# Patient Record
Sex: FEMALE | Race: WHITE | NOT HISPANIC OR LATINO | URBAN - METROPOLITAN AREA
[De-identification: names, ages, dates, MRNs, and addresses within clinical notes are randomized per-mention and may not be internally consistent; named-entity substitution may affect disease eponyms.]

---

## 2022-07-05 ENCOUNTER — APPOINTMENT (OUTPATIENT)
Dept: URBAN - METROPOLITAN AREA CLINIC 291 | Age: 22
Setting detail: DERMATOLOGY
End: 2022-07-05

## 2022-07-05 DIAGNOSIS — L03.01 CELLULITIS OF FINGER: ICD-10-CM

## 2022-07-05 DIAGNOSIS — L30.8 OTHER SPECIFIED DERMATITIS: ICD-10-CM

## 2022-07-05 DIAGNOSIS — D22 MELANOCYTIC NEVI: ICD-10-CM

## 2022-07-05 PROBLEM — L03.011 CELLULITIS OF RIGHT FINGER: Status: ACTIVE | Noted: 2022-07-05

## 2022-07-05 PROBLEM — D22.5 MELANOCYTIC NEVI OF TRUNK: Status: ACTIVE | Noted: 2022-07-05

## 2022-07-05 PROCEDURE — OTHER ORDER TESTS: OTHER

## 2022-07-05 PROCEDURE — OTHER PRESCRIPTION MEDICATION MANAGEMENT: OTHER

## 2022-07-05 PROCEDURE — OTHER COUNSELING: OTHER

## 2022-07-05 PROCEDURE — 99203 OFFICE O/P NEW LOW 30 MIN: CPT

## 2022-07-05 PROCEDURE — OTHER TREATMENT REGIMEN: OTHER

## 2022-07-05 PROCEDURE — OTHER PRESCRIPTION: OTHER

## 2022-07-05 RX ORDER — DOXYCYCLINE HYCLATE 100 MG/1
CAPSULE, GELATIN COATED ORAL
Qty: 20 | Refills: 0 | Status: ERX | COMMUNITY
Start: 2022-07-05

## 2022-07-05 RX ORDER — MUPIROCIN 20 MG/G
OINTMENT TOPICAL
Qty: 22 | Refills: 0 | Status: ERX | COMMUNITY
Start: 2022-07-05

## 2022-07-05 ASSESSMENT — LOCATION SIMPLE DESCRIPTION DERM
LOCATION SIMPLE: RIGHT RING FINGER
LOCATION SIMPLE: LEFT MIDDLE FINGER
LOCATION SIMPLE: RIGHT SMALL FINGER
LOCATION SIMPLE: LEFT INDEX FINGER
LOCATION SIMPLE: RIGHT MIDDLE FINGER
LOCATION SIMPLE: RIGHT LOWER BACK
LOCATION SIMPLE: RIGHT INDEX FINGER
LOCATION SIMPLE: LEFT RING FINGER

## 2022-07-05 ASSESSMENT — LOCATION ZONE DERM
LOCATION ZONE: FINGER
LOCATION ZONE: TRUNK

## 2022-07-05 ASSESSMENT — LOCATION DETAILED DESCRIPTION DERM
LOCATION DETAILED: LEFT RING FINGERTIP
LOCATION DETAILED: RIGHT DISTAL PALMAR INDEX FINGER
LOCATION DETAILED: RIGHT MIDDLE FINGERTIP
LOCATION DETAILED: RIGHT SMALL FINGERTIP
LOCATION DETAILED: RIGHT INFERIOR MEDIAL MIDBACK
LOCATION DETAILED: PERIUNGUAL SKIN RIGHT MIDDLE FINGER
LOCATION DETAILED: LEFT MIDDLE FINGERTIP
LOCATION DETAILED: RIGHT RING FINGERTIP
LOCATION DETAILED: LEFT INDEX FINGERTIP

## 2022-07-05 NOTE — PROCEDURE: PRESCRIPTION MEDICATION MANAGEMENT
Initiate Treatment: Doxycycline, 100 mg PO twice daily for 10 days.  Take with food and water.\\nMupirocin ointment, apply to affected area 3x/day for 7 days
Detail Level: Zone
Render In Strict Bullet Format?: No

## 2022-07-05 NOTE — HPI: RASH (ECZEMA)
How Severe Is Your Eczema?: moderate
Is This A New Presentation, Or A Follow-Up?: Rash
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  She washes her hands frequently.  She had a virtual visit a few months ago and was prescribed Clobetasol which helps but the rash continues to come and go.  She has stopped using hand .

## 2022-07-05 NOTE — PROCEDURE: TREATMENT REGIMEN
Continue Regimen: Clobetasol ointment twice daily, cycle 2 weeks on, 2 weeks off
Plan: Pending improvement consider Elidel for maintenance.  Patient also reports a scaly rash on her legs that come and go.  She will take a photo next time this occurs.  If unable to control with topical consider biopsy to rule out psoriasis.  Patient reports a family history of psoriasis
Otc Regimen: Recommend gentle moisturizer such as Cetaphil cream
Detail Level: Zone

## 2022-07-05 NOTE — HPI: SECONDARY COMPLAINT
How Severe Is This Condition?: mild
Additional History: Pt states that the mole use to be flat but has since raised.

## 2022-07-08 ENCOUNTER — RX ONLY (RX ONLY)
Age: 22
End: 2022-07-08

## 2022-07-08 RX ORDER — CLOBETASOL PROPIONATE 0.5 MG/G
OINTMENT TOPICAL
Qty: 60 | Refills: 2 | Status: ERX | COMMUNITY
Start: 2022-07-08

## 2022-07-12 ENCOUNTER — RX ONLY (RX ONLY)
Age: 22
End: 2022-07-12

## 2022-07-12 RX ORDER — SULFAMETHOXAZOLE AND TRIMETHOPRIM 800; 160 MG/1; MG/1
TABLET ORAL
Qty: 20 | Refills: 0 | Status: ERX | COMMUNITY
Start: 2022-07-12

## 2022-07-27 ENCOUNTER — APPOINTMENT (OUTPATIENT)
Dept: URBAN - METROPOLITAN AREA CLINIC 291 | Age: 22
Setting detail: DERMATOLOGY
End: 2022-07-27

## 2022-07-27 DIAGNOSIS — L30.8 OTHER SPECIFIED DERMATITIS: ICD-10-CM

## 2022-07-27 DIAGNOSIS — L03.01 CELLULITIS OF FINGER: ICD-10-CM

## 2022-07-27 PROBLEM — L03.011 CELLULITIS OF RIGHT FINGER: Status: ACTIVE | Noted: 2022-07-27

## 2022-07-27 PROCEDURE — OTHER COUNSELING: OTHER

## 2022-07-27 PROCEDURE — OTHER TREATMENT REGIMEN: OTHER

## 2022-07-27 PROCEDURE — OTHER MIPS QUALITY: OTHER

## 2022-07-27 PROCEDURE — 99213 OFFICE O/P EST LOW 20 MIN: CPT

## 2022-07-27 PROCEDURE — OTHER PRESCRIPTION: OTHER

## 2022-07-27 RX ORDER — CRISABOROLE 20 MG/G
OINTMENT TOPICAL
Qty: 60 | Refills: 0 | Status: ERX | COMMUNITY
Start: 2022-07-27

## 2022-07-27 ASSESSMENT — LOCATION SIMPLE DESCRIPTION DERM
LOCATION SIMPLE: RIGHT RING FINGER
LOCATION SIMPLE: LEFT INDEX FINGER
LOCATION SIMPLE: RIGHT INDEX FINGER
LOCATION SIMPLE: LEFT RING FINGER
LOCATION SIMPLE: RIGHT SMALL FINGER
LOCATION SIMPLE: RIGHT MIDDLE FINGER
LOCATION SIMPLE: LEFT MIDDLE FINGER

## 2022-07-27 ASSESSMENT — LOCATION DETAILED DESCRIPTION DERM
LOCATION DETAILED: RIGHT RING FINGERTIP
LOCATION DETAILED: RIGHT DISTAL PALMAR INDEX FINGER
LOCATION DETAILED: RIGHT SMALL FINGERTIP
LOCATION DETAILED: LEFT INDEX FINGERTIP
LOCATION DETAILED: LEFT MIDDLE FINGERTIP
LOCATION DETAILED: PERIUNGUAL SKIN RIGHT RING FINGER
LOCATION DETAILED: RIGHT MIDDLE FINGERTIP
LOCATION DETAILED: LEFT RING FINGERTIP

## 2022-07-27 ASSESSMENT — LOCATION ZONE DERM: LOCATION ZONE: FINGER

## 2022-10-05 NOTE — PROCEDURE: TREATMENT REGIMEN
Detail Level: Simple
HR=53 bpm, FVUY=989/67 mmhg, SpO2=99.0 %, Resp=12 B/min, EtCO2=37 mmHg, Apnea=5 Seconds, Ramires=2
Initiate Treatment: Eucrisa 2 % topical ointment, apply to affected areas twice daily when not using topical steorids
Discontinue Regimen: Mupirocin
Otc Regimen: Recommend gentle moisturizer such as Cetaphil cream
Continue Regimen: Clobetasol ointment twice daily, cycle 2 weeks on, 2 weeks off as needed for flares
Continue Regimen: Bactrim as previously prescribed
Plan: Anderdoreen did not complete Bactrim course previously prescribed based on culture results, she is going to do this
Detail Level: Zone